# Patient Record
Sex: FEMALE | Race: WHITE | NOT HISPANIC OR LATINO | Employment: PART TIME | URBAN - METROPOLITAN AREA
[De-identification: names, ages, dates, MRNs, and addresses within clinical notes are randomized per-mention and may not be internally consistent; named-entity substitution may affect disease eponyms.]

---

## 2023-03-08 ENCOUNTER — TELEPHONE (OUTPATIENT)
Dept: PRIMARY CARE | Facility: CLINIC | Age: 53
End: 2023-03-08
Payer: COMMERCIAL

## 2023-03-08 DIAGNOSIS — Z12.39 ENCOUNTER FOR BREAST CANCER SCREENING USING NON-MAMMOGRAM MODALITY: Primary | ICD-10-CM

## 2023-06-05 ENCOUNTER — TELEPHONE (OUTPATIENT)
Dept: PRIMARY CARE | Facility: CLINIC | Age: 53
End: 2023-06-05
Payer: COMMERCIAL

## 2023-06-05 DIAGNOSIS — I89.0 LYMPHEDEMA OF ARM: Primary | ICD-10-CM

## 2023-06-05 PROBLEM — E55.9 VITAMIN D DEFICIENCY: Status: ACTIVE | Noted: 2023-06-05

## 2023-06-05 PROBLEM — Z86.000 HISTORY OF LOBULAR CARCINOMA IN SITU (LCIS) OF BREAST: Status: ACTIVE | Noted: 2023-06-05

## 2023-06-05 PROBLEM — E78.5 BORDERLINE HYPERLIPIDEMIA: Status: ACTIVE | Noted: 2023-06-05

## 2023-06-05 PROBLEM — R73.9 ELEVATED BLOOD SUGAR: Status: ACTIVE | Noted: 2023-06-05

## 2023-06-05 PROBLEM — K64.9 HEMORRHOIDS: Status: ACTIVE | Noted: 2023-06-05

## 2023-06-05 PROBLEM — C77.3 CARCINOMA OF LEFT BREAST METASTATIC TO AXILLARY LYMPH NODE (MULTI): Status: ACTIVE | Noted: 2023-06-05

## 2023-06-05 PROBLEM — K21.9 GERD (GASTROESOPHAGEAL REFLUX DISEASE): Status: ACTIVE | Noted: 2023-06-05

## 2023-06-05 PROBLEM — K82.4 GALLBLADDER POLYP: Status: ACTIVE | Noted: 2023-06-05

## 2023-06-05 PROBLEM — F41.9 ANXIETY: Status: ACTIVE | Noted: 2023-06-05

## 2023-06-05 PROBLEM — Z90.13 STATUS POST BILATERAL MASTECTOMY: Status: ACTIVE | Noted: 2023-06-05

## 2023-06-05 PROBLEM — R74.8 ABNORMAL LIVER ENZYMES: Status: ACTIVE | Noted: 2023-06-05

## 2023-06-05 PROBLEM — N60.99 ATYPICAL LOBULAR HYPERPLASIA OF BREAST: Status: ACTIVE | Noted: 2023-06-05

## 2023-06-05 PROBLEM — C50.912 CARCINOMA OF LEFT BREAST METASTATIC TO AXILLARY LYMPH NODE (MULTI): Status: ACTIVE | Noted: 2023-06-05

## 2023-06-05 NOTE — TELEPHONE ENCOUNTER
Pt left message asking for order for manual lymph drainage. She goes to specialist but needs referral. Asking for call back to give us the doctors info for referral.

## 2023-06-06 ENCOUNTER — TELEPHONE (OUTPATIENT)
Dept: PRIMARY CARE | Facility: CLINIC | Age: 53
End: 2023-06-06
Payer: COMMERCIAL

## 2023-06-06 DIAGNOSIS — I89.0 LYMPHEDEMA OF ARM: Primary | ICD-10-CM

## 2023-06-06 NOTE — TELEPHONE ENCOUNTER
Pt left message that the PT appt that was ordered is not what she needs.  She needs a script for lymph drainage from a private provider for lymphedema their number is [phone] after we get script call to get fax number to fax over and advise pt its done.

## 2023-06-07 NOTE — TELEPHONE ENCOUNTER
Faxed over her Lymphatic therapy order to [phone]  Called Maryjane and left a message that I faxed it over for her.

## 2023-10-16 ENCOUNTER — TELEMEDICINE (OUTPATIENT)
Dept: PRIMARY CARE | Facility: CLINIC | Age: 53
End: 2023-10-16
Payer: COMMERCIAL

## 2023-10-16 DIAGNOSIS — R73.9 ELEVATED BLOOD SUGAR: Primary | ICD-10-CM

## 2023-10-16 PROCEDURE — 99214 OFFICE O/P EST MOD 30 MIN: CPT | Performed by: FAMILY MEDICINE

## 2023-10-16 NOTE — PROGRESS NOTES
Subjective   Patient ID: Maryjane Finley is a 52 y.o. female who presents for No chief complaint on file..  HPI  Had blood work with the oncologist.  Saw him and discussed coming off the anastrazole and Lupron.  On the blood test Fasting blood sugar 119.  History of insulin resistance.        Creatinine elevated    Blood sugar and blood pressure elevated.    Oncologist looking to stop medication      Review of Systems    Objective   Physical Exam    Assessment/Plan   Problem List Items Addressed This Visit       Elevated blood sugar - Primary    Relevant Orders    CBC and Auto Differential    Comprehensive Metabolic Panel    Hemoglobin A1C    Insulin, Fasting     Other Visit Diagnoses       Creatinine elevation        Relevant Orders    CBC and Auto Differential    Comprehensive Metabolic Panel    Hemoglobin A1C    Insulin, Fasting        Repeat blood work  Stay hydrated  Will have her come in for BP check  Breast MRI ordered    Time spent on video speaking with patient was 35 minutes

## 2023-10-25 ENCOUNTER — APPOINTMENT (OUTPATIENT)
Dept: PRIMARY CARE | Facility: CLINIC | Age: 53
End: 2023-10-25

## 2024-02-15 ENCOUNTER — TELEPHONE (OUTPATIENT)
Dept: PRIMARY CARE | Facility: CLINIC | Age: 54
End: 2024-02-15
Payer: COMMERCIAL

## 2024-04-24 ENCOUNTER — EDUCATION (OUTPATIENT)
Dept: HEMATOLOGY/ONCOLOGY | Facility: CLINIC | Age: 54
End: 2024-04-24
Payer: COMMERCIAL

## 2024-04-24 NOTE — RESEARCH NOTES
Research Note Follow Up Visit       Maryjane Finley was seen at [facility] for follow up on [id number] on 4/15/2024.  This RN has reached out to the patient multiple times to no avail. Conmed assessment, AE assessment, recurrence assessment, cardiac/brachial plexopathy events (none noted), mastectomy/reconstruction complications (none noted), history and physical, and clinical lymphedema assessment were obtained from medical review. Follow up procedures completed per protocol.

## 2024-05-16 ENCOUNTER — TELEPHONE (OUTPATIENT)
Dept: PRIMARY CARE | Facility: CLINIC | Age: 54
End: 2024-05-16
Payer: COMMERCIAL

## 2024-05-16 DIAGNOSIS — I89.0 LYMPHEDEMA OF ARM: ICD-10-CM

## 2024-05-16 DIAGNOSIS — N60.99 ATYPICAL LOBULAR HYPERPLASIA OF BREAST: Primary | ICD-10-CM

## 2024-05-16 NOTE — TELEPHONE ENCOUNTER
Pt rose requesting order for diagnostic mammogram, is going to have it done at [facility].

## 2024-05-28 ENCOUNTER — HOSPITAL ENCOUNTER (OUTPATIENT)
Dept: RADIOLOGY | Facility: HOSPITAL | Age: 54
Discharge: HOME | End: 2024-05-28
Payer: COMMERCIAL

## 2024-05-28 DIAGNOSIS — N60.99 ATYPICAL LOBULAR HYPERPLASIA OF BREAST: ICD-10-CM

## 2024-05-28 DIAGNOSIS — I89.0 LYMPHEDEMA OF ARM: ICD-10-CM

## 2024-06-04 ENCOUNTER — APPOINTMENT (OUTPATIENT)
Dept: RADIOLOGY | Facility: HOSPITAL | Age: 54
End: 2024-06-04
Payer: COMMERCIAL

## 2024-07-23 ENCOUNTER — APPOINTMENT (OUTPATIENT)
Dept: RADIOLOGY | Facility: HOSPITAL | Age: 54
End: 2024-07-23
Payer: COMMERCIAL

## 2024-07-31 ENCOUNTER — TELEPHONE (OUTPATIENT)
Dept: PRIMARY CARE | Facility: CLINIC | Age: 54
End: 2024-07-31
Payer: COMMERCIAL

## 2024-07-31 DIAGNOSIS — Z86.000 HISTORY OF LOBULAR CARCINOMA IN SITU (LCIS) OF BREAST: Primary | ICD-10-CM

## 2024-07-31 DIAGNOSIS — I89.0 LYMPHEDEMA OF ARM: ICD-10-CM

## 2024-08-07 ENCOUNTER — HOSPITAL ENCOUNTER (OUTPATIENT)
Dept: RADIOLOGY | Facility: HOSPITAL | Age: 54
Discharge: HOME | End: 2024-08-07
Payer: COMMERCIAL

## 2024-08-07 DIAGNOSIS — C77.3 CARCINOMA OF LEFT BREAST METASTATIC TO AXILLARY LYMPH NODE (MULTI): ICD-10-CM

## 2024-08-07 DIAGNOSIS — C50.912 CARCINOMA OF LEFT BREAST METASTATIC TO AXILLARY LYMPH NODE (MULTI): ICD-10-CM

## 2024-08-07 PROCEDURE — A9575 INJ GADOTERATE MEGLUMI 0.1ML: HCPCS | Performed by: FAMILY MEDICINE

## 2024-08-07 PROCEDURE — 77049 MRI BREAST C-+ W/CAD BI: CPT

## 2024-08-07 PROCEDURE — 2550000001 HC RX 255 CONTRASTS: Performed by: FAMILY MEDICINE

## 2024-08-07 RX ORDER — GADOTERATE MEGLUMINE 376.9 MG/ML
0.2 INJECTION INTRAVENOUS
Status: COMPLETED | OUTPATIENT
Start: 2024-08-07 | End: 2024-08-07

## 2024-08-12 ENCOUNTER — TELEPHONE (OUTPATIENT)
Dept: PRIMARY CARE | Facility: CLINIC | Age: 54
End: 2024-08-12
Payer: COMMERCIAL

## 2024-08-12 NOTE — TELEPHONE ENCOUNTER
Called Maryjane and let her know,. She asked if we could send the results to Dr. Main   Fax [phone]

## 2024-08-12 NOTE — TELEPHONE ENCOUNTER
----- Message from Ivan Bejarano sent at 8/12/2024  9:47 AM EDT -----  Please let the patient know that the mammogram showed 2 new masses in the reconstructed left breast and where they are at its most likely representative of fat necrosis.  They recommended that she have some more imaging done with mammography.  Please see if the patient is having this ordered by different provider or if we need to order this.

## 2024-08-21 NOTE — TELEPHONE ENCOUNTER
Patient called back, she would like to have the additional imaging done. However due to reconstructive surgery she had previously, getting a mammogram is extremely complicated

## 2024-08-22 NOTE — TELEPHONE ENCOUNTER
Spoke with [email or web address] and they are going to fax over a form that will need filled out. Patient is requesting a quantity of 3 sleeves and a glove. [facility] [employer] is asking for the compression to be filled out as well.

## 2024-08-27 DIAGNOSIS — Z12.39 ENCOUNTER FOR BREAST CANCER SCREENING USING NON-MAMMOGRAM MODALITY: Primary | ICD-10-CM

## 2024-09-23 ENCOUNTER — TELEPHONE (OUTPATIENT)
Dept: PRIMARY CARE | Facility: CLINIC | Age: 54
End: 2024-09-23
Payer: COMMERCIAL

## 2024-09-23 NOTE — TELEPHONE ENCOUNTER
Spoke with pt she is using 20-30mm and she would like a quantity of 2, and the same compression for the hand one as well

## 2024-09-23 NOTE — TELEPHONE ENCOUNTER
Pt called back she also needs a script for a physical therapy eval and treatment for her lymphedema    [facility]  F:[phone]

## 2024-09-30 NOTE — TELEPHONE ENCOUNTER
Bonny with [facility] left a voicemail stating they need a most recent office note stating the patient uses the compression garment and why she needs them. Last office visit is from  but that was for blood pressure. Patient was contacted and advised of this but stated she isn't sure why she needs an appointment that she uses these all the time. Patient is going to reach out to Bonny and talk with her, she will call the office back if she wants to schedule an appt.     Bonny  (P) [phone] ext [phone]  (F) [phone]

## 2024-10-02 NOTE — TELEPHONE ENCOUNTER
Spoke with Sally from [employer] patient request they just run the script through her insurance for the [employer] as she doesn't want to come in for an office visit. Sally submitted this morning and will keep the patient updated on if they approve it or not. Nothing else at this time is needed from us.

## 2024-10-07 ENCOUNTER — TELEPHONE (OUTPATIENT)
Dept: PRIMARY CARE | Facility: CLINIC | Age: 54
End: 2024-10-07
Payer: COMMERCIAL

## 2024-10-07 DIAGNOSIS — I89.0 LYMPHEDEMA OF ARM: Primary | ICD-10-CM

## 2024-10-07 DIAGNOSIS — Z86.000 HISTORY OF LOBULAR CARCINOMA IN SITU (LCIS) OF BREAST: ICD-10-CM

## 2024-10-07 NOTE — TELEPHONE ENCOUNTER
Pt is requesting you place a referral to Kath[facility]cal[facility] for evaluation and treatment of lymphedema.  We need to fax it to [phone].

## 2025-01-07 ENCOUNTER — TELEPHONE (OUTPATIENT)
Dept: PRIMARY CARE | Facility: CLINIC | Age: 55
End: 2025-01-07
Payer: COMMERCIAL

## 2025-02-18 ENCOUNTER — HOSPITAL ENCOUNTER (OUTPATIENT)
Dept: RADIOLOGY | Facility: HOSPITAL | Age: 55
Discharge: HOME | End: 2025-02-18
Payer: COMMERCIAL

## 2025-02-18 DIAGNOSIS — C77.3 CARCINOMA OF LEFT BREAST METASTATIC TO AXILLARY LYMPH NODE (MULTI): Primary | ICD-10-CM

## 2025-02-18 DIAGNOSIS — C50.912 CARCINOMA OF LEFT BREAST METASTATIC TO AXILLARY LYMPH NODE (MULTI): Primary | ICD-10-CM

## 2025-02-18 DIAGNOSIS — N60.99 ATYPICAL LOBULAR HYPERPLASIA OF BREAST: ICD-10-CM

## 2025-02-18 DIAGNOSIS — Z12.39 ENCOUNTER FOR BREAST CANCER SCREENING USING NON-MAMMOGRAM MODALITY: ICD-10-CM

## 2025-02-18 PROCEDURE — 2550000001 HC RX 255 CONTRASTS: Performed by: FAMILY MEDICINE

## 2025-02-18 PROCEDURE — A9575 INJ GADOTERATE MEGLUMI 0.1ML: HCPCS | Performed by: FAMILY MEDICINE

## 2025-02-18 PROCEDURE — 77049 MRI BREAST C-+ W/CAD BI: CPT

## 2025-02-18 RX ORDER — GADOTERATE MEGLUMINE 376.9 MG/ML
0.2 INJECTION INTRAVENOUS
Status: COMPLETED | OUTPATIENT
Start: 2025-02-18 | End: 2025-02-18

## 2025-02-18 RX ADMIN — GADOTERATE MEGLUMINE 13 ML: 376.9 INJECTION INTRAVENOUS at 07:28

## 2025-02-18 NOTE — RESULT ENCOUNTER NOTE
Patient need refill on his Indocine I had to reschedule appointment on 11/4/2019 patient use [facility] on [address].Patient can be reach at [phone].   Please let the patient know  1. Stable probably benign 2 left breast masses. A short-term six-month follow-up MRI is recommended.  I ordered the 6-month follow-up MRI  2. No evidence of malignancy in the right breast.

## 2025-06-06 ENCOUNTER — TELEPHONE (OUTPATIENT)
Dept: PRIMARY CARE | Facility: CLINIC | Age: 55
End: 2025-06-06
Payer: COMMERCIAL

## 2025-06-06 NOTE — TELEPHONE ENCOUNTER
Looks like patient hasn't been in office since 10.2023, she is requesting the follow orders to be faxed to [phone].     Compression Sleeve:   Item # [id number]   Compression 15-20   Size 6    Hand Garmet     Item # [id number]    **Patient was advised in the past to make appt to discuss this and she refused and asked for [facility] to just run it through the insurance. At some point she's going to need to come in or she'll be considered a new patient come next October.

## 2025-06-07 DIAGNOSIS — I89.0 LYMPHEDEMA OF ARM: Primary | ICD-10-CM

## 2025-06-07 DIAGNOSIS — Z86.000 HISTORY OF LOBULAR CARCINOMA IN SITU (LCIS) OF BREAST: ICD-10-CM

## 2025-06-07 DIAGNOSIS — Z90.13 STATUS POST BILATERAL MASTECTOMY: ICD-10-CM

## 2025-06-07 NOTE — PROGRESS NOTES
Compression Sleeve:   Item # [id number]   Compression 15-20   Size 6     Hand Garmet     Item # [id number]

## 2025-06-09 NOTE — TELEPHONE ENCOUNTER
Order faxed to the number provided which belongs to [facility].     I also got the patient to schedule for a wellness, she is scheduled for October.

## 2025-10-27 ENCOUNTER — APPOINTMENT (OUTPATIENT)
Dept: PRIMARY CARE | Facility: CLINIC | Age: 55
End: 2025-10-27
Payer: COMMERCIAL